# Patient Record
Sex: FEMALE
[De-identification: names, ages, dates, MRNs, and addresses within clinical notes are randomized per-mention and may not be internally consistent; named-entity substitution may affect disease eponyms.]

---

## 2018-05-24 ENCOUNTER — RESULT REVIEW (OUTPATIENT)
Age: 28
End: 2018-05-24

## 2019-05-07 PROBLEM — Z00.00 ENCOUNTER FOR PREVENTIVE HEALTH EXAMINATION: Status: ACTIVE | Noted: 2019-05-07

## 2019-05-13 ENCOUNTER — RX RENEWAL (OUTPATIENT)
Age: 29
End: 2019-05-13

## 2019-05-13 DIAGNOSIS — Z78.9 OTHER SPECIFIED HEALTH STATUS: ICD-10-CM

## 2019-05-13 RX ORDER — NORETHINDRONE ACETATE/ETHINYL ESTRADIOL AND FERROUS FUMARATE 1MG-20(21)
1-20 KIT ORAL
Qty: 1 | Refills: 3 | Status: ACTIVE | COMMUNITY
Start: 2019-05-13 | End: 1900-01-01

## 2019-05-28 ENCOUNTER — APPOINTMENT (OUTPATIENT)
Dept: OBGYN | Facility: CLINIC | Age: 29
End: 2019-05-28

## 2019-06-03 PROBLEM — Z78.9 BIRTH CONTROL: Status: ACTIVE | Noted: 2019-05-13

## 2019-06-20 ENCOUNTER — RECORD ABSTRACTING (OUTPATIENT)
Age: 29
End: 2019-06-20

## 2019-06-20 DIAGNOSIS — Z78.9 OTHER SPECIFIED HEALTH STATUS: ICD-10-CM

## 2019-06-20 DIAGNOSIS — Z80.3 FAMILY HISTORY OF MALIGNANT NEOPLASM OF BREAST: ICD-10-CM

## 2019-06-20 DIAGNOSIS — N94.6 DYSMENORRHEA, UNSPECIFIED: ICD-10-CM

## 2019-06-20 DIAGNOSIS — Z80.42 FAMILY HISTORY OF MALIGNANT NEOPLASM OF PROSTATE: ICD-10-CM

## 2019-06-20 LAB — CYTOLOGY CVX/VAG DOC THIN PREP: NORMAL

## 2019-06-20 RX ORDER — NORETHINDRONE ACETATE AND ETHINYL ESTRADIOL 1MG-20(24)
1-20 KIT ORAL
Refills: 0 | Status: ACTIVE | COMMUNITY

## 2019-06-22 ENCOUNTER — APPOINTMENT (OUTPATIENT)
Dept: OBGYN | Facility: CLINIC | Age: 29
End: 2019-06-22
Payer: COMMERCIAL

## 2019-06-22 VITALS
HEIGHT: 59 IN | SYSTOLIC BLOOD PRESSURE: 110 MMHG | BODY MASS INDEX: 23.99 KG/M2 | WEIGHT: 119 LBS | DIASTOLIC BLOOD PRESSURE: 80 MMHG

## 2019-06-22 DIAGNOSIS — Z30.09 ENCOUNTER FOR OTHER GENERAL COUNSELING AND ADVICE ON CONTRACEPTION: ICD-10-CM

## 2019-06-22 DIAGNOSIS — Z78.9 OTHER SPECIFIED HEALTH STATUS: ICD-10-CM

## 2019-06-22 DIAGNOSIS — Z82.49 FAMILY HISTORY OF ISCHEMIC HEART DISEASE AND OTHER DISEASES OF THE CIRCULATORY SYSTEM: ICD-10-CM

## 2019-06-22 DIAGNOSIS — Z00.00 ENCOUNTER FOR GENERAL ADULT MEDICAL EXAMINATION W/OUT ABNORMAL FINDINGS: ICD-10-CM

## 2019-06-22 DIAGNOSIS — Z80.8 FAMILY HISTORY OF MALIGNANT NEOPLASM OF OTHER ORGANS OR SYSTEMS: ICD-10-CM

## 2019-06-22 DIAGNOSIS — Z30.40 ENCOUNTER FOR SURVEILLANCE OF CONTRACEPTIVES, UNSPECIFIED: ICD-10-CM

## 2019-06-22 DIAGNOSIS — Z01.419 ENCOUNTER FOR GYNECOLOGICAL EXAMINATION (GENERAL) (ROUTINE) W/OUT ABNORMAL FINDINGS: ICD-10-CM

## 2019-06-22 DIAGNOSIS — Z12.4 ENCOUNTER FOR SCREENING FOR MALIGNANT NEOPLASM OF CERVIX: ICD-10-CM

## 2019-06-22 LAB
BILIRUB UR QL STRIP: NORMAL
GLUCOSE UR-MCNC: NORMAL
HCG UR QL: 0.2 EU/DL
HCG UR QL: NEGATIVE
HGB UR QL STRIP.AUTO: NORMAL
KETONES UR-MCNC: NORMAL
LEUKOCYTE ESTERASE UR QL STRIP: NORMAL
NITRITE UR QL STRIP: NORMAL
PH UR STRIP: 6
PROT UR STRIP-MCNC: NORMAL
QUALITY CONTROL: YES
SP GR UR STRIP: 1.03

## 2019-06-22 PROCEDURE — 81025 URINE PREGNANCY TEST: CPT

## 2019-06-22 PROCEDURE — 99395 PREV VISIT EST AGE 18-39: CPT

## 2019-06-22 PROCEDURE — 81003 URINALYSIS AUTO W/O SCOPE: CPT | Mod: QW

## 2019-06-22 RX ORDER — DIPHENHYDRAMINE HCL 25 MG
25 CAPSULE ORAL
Refills: 0 | Status: DISCONTINUED | COMMUNITY
End: 2018-06-22

## 2019-06-22 RX ORDER — NORETHINDRONE ACETATE AND ETHINYL ESTRADIOL AND FERROUS FUMARATE 1MG-20(21)
1-20 KIT ORAL
Qty: 3 | Refills: 3 | Status: ACTIVE | COMMUNITY
Start: 2019-06-22 | End: 1900-01-01

## 2019-06-22 RX ORDER — LORATADINE 5 MG/5 ML
10 SOLUTION, ORAL ORAL
Refills: 0 | Status: DISCONTINUED | COMMUNITY
End: 2018-06-22

## 2019-06-22 NOTE — HISTORY OF PRESENT ILLNESS
[1 Year Ago] : 1 year ago [Good] : being in good health [Healthy Diet] : a healthy diet [Regular Exercise] : regular exercise [Last Pap ___] : Last cervical pap smear was [unfilled] [Reproductive Age] : is of reproductive age [Oral Contraceptive] : uses oral contraception pills [Monogamous (Male Partner)] : is monogamous with a male partner [Menarche Age: ____] : age at menarche was [unfilled] [Definite:  ___ (Date)] : the last menstrual period was [unfilled] [Monogamous] : is monogamous [Sexually Active] : is sexually active [Oral Contraceptives] : uses oral contraceptives [Contraception] : uses contraception [Yes] : yes [Menstrual Problems] : reports normal menses [Pregnancy History] : denies prior pregnancies [Burning] : no burning [Mass] : no mass [Itching] : no itching [Stinging] : no stinging [Lesion] : no lesion [Soreness] : no soreness [Discharge] : no discharge [Localized Pain] : no localized pain [Mass (___cm)] : no palpable mass [Diffused Pain] : no diffused pain [Skin Color Change] : no skin color change [Nipple Discharge] : no nipple discharge [Hot Flashes] : no hot flashes [Night Sweats] : no night sweats

## 2019-06-22 NOTE — COUNSELING
[Nutrition] : nutrition [Breast Self Exam] : breast self exam [Vitamins/Supplements] : vitamins/supplements [Contraception] : contraception [Exercise] : exercise

## 2019-06-22 NOTE — PHYSICAL EXAM
[Awake] : awake [Alert] : alert [Soft] : soft [Oriented x3] : oriented to person, place, and time [Normal] : uterus [Labia Majora] : labia major [Labia Minora] : labia minora [No Bleeding] : there was no active vaginal bleeding [Uterine Adnexae] : were not tender and not enlarged [Acute Distress] : no acute distress [Mass] : no breast mass [Nipple Discharge] : no nipple discharge [Axillary LAD] : no axillary lymphadenopathy [Tender] : non tender [FreeTextEntry6] : normal

## 2019-06-22 NOTE — DISCUSSION/SUMMARY
[Combined Oral Contraception] : combined oral contraception [Pregnancy Prevention] : pregnancy prevention [FreeTextEntry1] : The R./B/C of OBC's reviewed.  All the pt's questions and concerns were addressed.

## 2019-06-27 LAB — CYTOLOGY CVX/VAG DOC THIN PREP: NORMAL

## 2020-12-21 PROBLEM — Z01.419 ENCOUNTER FOR ANNUAL ROUTINE GYNECOLOGICAL EXAMINATION: Status: RESOLVED | Noted: 2019-06-22 | Resolved: 2020-12-21
